# Patient Record
Sex: FEMALE | URBAN - METROPOLITAN AREA
[De-identification: names, ages, dates, MRNs, and addresses within clinical notes are randomized per-mention and may not be internally consistent; named-entity substitution may affect disease eponyms.]

---
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
## 2024-01-04 ENCOUNTER — TELEPHONE (OUTPATIENT)
Dept: INTERNAL MEDICINE | Age: 19
End: 2024-01-04

## 2024-01-04 NOTE — TELEPHONE ENCOUNTER
Specialty Medication Service    Date: 1/4/2024  Patient's Name: Molly Saravia YOB: 2005            _____________________________________________________________________________________________    Email received from [facility] in regard to current SMS formulary medication, humira.  SMS override placed. .     Allyssa Martinez CPhT  Clinical   Specialty Medication Services  Phone: [phone] option #4  Fax: [phone]    For Pharmacy Admin Tracking Only    Program: [facility]  Recommendation Provided To: Pharmacy: 1  Intervention Detail: Benefit Assistance  Intervention Accepted By: Pharmacy: 1   Time Spent (min): 15

## 2024-01-16 ENCOUNTER — TELEPHONE (OUTPATIENT)
Facility: HOSPITAL | Age: 19
End: 2024-01-16

## 2024-01-16 NOTE — TELEPHONE ENCOUNTER
Specialty Medication Service    Date: 1/16/2024  Patient's Name: Molly Saravia YOB: 2005            _____________________________________________________________________________________________    Left message to re-engage the patient into the Specialty Medication Service. Patient did not answer to be in the SMS program. Please call: [phone] option 4. Will continue to outreach as appropriate.    Allyssa Martinez CPhT  Clinical   Specialty Medication Services  Phone: [phone] option #4  Fax: [phone]

## 2024-05-17 RX ORDER — NORETHINDRONE ACETATE AND ETHINYL ESTRADIOL 1; 20 MG/1; UG/1
1 TABLET ORAL DAILY
Qty: 21 TABLET | Refills: 1 | Status: SHIPPED | OUTPATIENT
Start: 2024-05-17

## 2024-07-01 RX ORDER — NORETHINDRONE ACETATE AND ETHINYL ESTRADIOL 1; 20 MG/1; UG/1
1 TABLET ORAL DAILY
Qty: 21 TABLET | Refills: 0 | Status: SHIPPED | OUTPATIENT
Start: 2024-07-01

## 2024-07-08 ENCOUNTER — OFFICE VISIT (OUTPATIENT)
Dept: OBGYN CLINIC | Age: 19
End: 2024-07-08
Payer: COMMERCIAL

## 2024-07-08 VITALS
BODY MASS INDEX: 25.86 KG/M2 | HEART RATE: 69 BPM | TEMPERATURE: 98 F | WEIGHT: 160.2 LBS | SYSTOLIC BLOOD PRESSURE: 120 MMHG | DIASTOLIC BLOOD PRESSURE: 70 MMHG

## 2024-07-08 DIAGNOSIS — Z01.419 ENCNTR FOR GYN EXAM (GENERAL) (ROUTINE) W/O ABN FINDINGS: Primary | ICD-10-CM

## 2024-07-08 DIAGNOSIS — Z30.41 ENCOUNTER FOR SURVEILLANCE OF CONTRACEPTIVE PILLS: ICD-10-CM

## 2024-07-08 PROCEDURE — 99395 PREV VISIT EST AGE 18-39: CPT | Performed by: OBSTETRICS & GYNECOLOGY

## 2024-07-08 RX ORDER — NORETHINDRONE ACETATE AND ETHINYL ESTRADIOL .02; 1 MG/1; MG/1
1 TABLET ORAL DAILY
Qty: 21 TABLET | Refills: 13 | Status: SHIPPED | OUTPATIENT
Start: 2024-07-08

## 2024-07-08 ASSESSMENT — ENCOUNTER SYMPTOMS
SORE THROAT: 0
NAUSEA: 0
SHORTNESS OF BREATH: 0
COUGH: 0
CONSTIPATION: 0
DIARRHEA: 0
ABDOMINAL PAIN: 0
VOMITING: 0

## 2024-07-08 NOTE — PROGRESS NOTES
(exact date).   Age of Menarche: 13-14  Menstrual Period: regular  Interval Between Menses: Monthly   Duration of Menses: 4-5 days   Menstrual Flow: Moderate  Bleeding between menses: Denies  Painful menses: Denies    Sexual History:  Contraception: see above  Currently is sexually active  1 Lifetime partners  Denies history of STIs    Pap History:  History of abnormal pap smears: see above  Last pap: see above      Medical History:  Past Medical History:   Diagnosis Date    Autoimmune disorder (HCC)     RA    Juvenile rheumatoid arthritis (HCC)     Mental disorder        Medications:  Current Outpatient Medications   Medication Sig Dispense Refill    norethindrone-ethinyl estradiol (JUNEL 1/20) 1-20 MG-MCG per tablet TAKE 1 TABLET BY MOUTH DAILY 21 tablet 0    Adalimumab 40 MG/0.4ML PNKT Inject 40 mg into the skin every 14 days 2 each 6     No current facility-administered medications for this visit.       Surgical History:  History reviewed. No pertinent surgical history.    Allergies:  No Known Allergies    Family History:  Family History   Problem Relation Age of Onset    Cancer Paternal Grandfather     Stroke Maternal Grandfather     Mental Illness Mother     Diabetes Sister        Denies personal/family history of cervical, uterine, ovarian, vulvar, breast, or colon cancers.  Denies personal/family history of bleeding or clotting disorders  Denies personal/family history of genetic disorders    Social History:  Social History     Socioeconomic History    Marital status: Single     Spouse name: None    Number of children: None    Years of education: None    Highest education level: None   Tobacco Use    Smoking status: Never    Smokeless tobacco: Never   Vaping Use    Vaping Use: Never used   Substance and Sexual Activity    Alcohol use: Never    Drug use: Never    Sexual activity: Yes     Partners: Male       Objective:  Body mass index is 25.86 kg/m².  /70 (Site: Right Upper Arm, Position: Sitting, Cuff

## 2024-08-06 RX ORDER — NORETHINDRONE ACETATE AND ETHINYL ESTRADIOL 1; 20 MG/1; UG/1
1 TABLET ORAL DAILY
Qty: 21 TABLET | Refills: 13 | OUTPATIENT
Start: 2024-08-06

## 2025-02-12 ENCOUNTER — TELEPHONE (OUTPATIENT)
Facility: HOSPITAL | Age: 20
End: 2025-02-12

## 2025-02-13 NOTE — TELEPHONE ENCOUNTER
Specialty Medication Service    Date: 2/13/2025  Patient's Name: Molly Saravia YOB: 2005            _____________________________________________________________________________________________    Left message to re-engage the patient into the Specialty Medication Service.  Please call: [phone] option 4. Will continue to outreach as appropriate.    Allyssa Martinez CPhT  Clinical   Specialty Medication Services  Phone: [phone] option #4  Fax: [phone]

## 2025-02-19 NOTE — TELEPHONE ENCOUNTER
Specialty Medication Service    Date: 2/19/2025  Patient's Name: Molly Saravia YOB: 2005            _____________________________________________________________________________________________    Tried to call patient phone, however she is in class and states out of service area. Spoke to her father following due to be secondary contact. Explained the program and asked he have Molly give us a call. States he will speak with her and have her call us back. Will follow-up next week for final call attempt to re-engage.     Washington Glasgow, PharmD [facility]  Ambulatory Clinical Pharmacist  Specialty Medication Services  Phone: [phone] option #4  Fax: [phone]

## 2025-02-27 NOTE — TELEPHONE ENCOUNTER
Specialty Medication Service    Date: 2/27/2025  Patient's Name: Molly Saravia YOB: 2005            _____________________________________________________________________________________________    Left three messages and spoke with patient father  to re-engage the patient into the Specialty Medication Service. Patient is still unable to be contacted for the program.  to be in the SMS program. Please call: [phone] option 4. Will place year override and attempt to re-engage in one year.     Washington Glasgow, PharmD [facility]  Ambulatory Clinical Pharmacist  Specialty Medication Services  Phone: [phone] option #4  Fax: [phone]     For Pharmacy Admin Tracking Only    Program: [employer]  CPA in place:  No  Recommendation Provided To: Patient/Caregiver: 1 via Telephone  Intervention Detail: Scheduled Appointment  Intervention Accepted By: Patient/Caregiver: 0    Time Spent (min): 20

## 2025-05-12 ENCOUNTER — TELEPHONE (OUTPATIENT)
Facility: HOSPITAL | Age: 20
End: 2025-05-12

## 2025-05-12 NOTE — TELEPHONE ENCOUNTER
Specialty Medication Service    Date: 5/12/2025  Patient's Name: Molly Saravia YOB: 2005            _____________________________________________________________________________________________    Updated override since pt has declined [facility] clinical services    Allyssa Martinez CPhT  Clinical    Specialty Medication Services  Phone: [phone] option #4  Fax: [phone]    For Pharmacy Admin Tracking Only    Program: [employer]    Time Spent (min): 10